# Patient Record
Sex: FEMALE
[De-identification: names, ages, dates, MRNs, and addresses within clinical notes are randomized per-mention and may not be internally consistent; named-entity substitution may affect disease eponyms.]

---

## 2018-04-10 ENCOUNTER — HOSPITAL ENCOUNTER (OUTPATIENT)
Dept: HOSPITAL 5 - GIO | Age: 83
Discharge: HOME | End: 2018-04-10
Attending: INTERNAL MEDICINE
Payer: MEDICARE

## 2018-04-10 VITALS — DIASTOLIC BLOOD PRESSURE: 86 MMHG | SYSTOLIC BLOOD PRESSURE: 133 MMHG

## 2018-04-10 DIAGNOSIS — Z91.048: ICD-10-CM

## 2018-04-10 DIAGNOSIS — J39.2: Primary | ICD-10-CM

## 2018-04-10 DIAGNOSIS — Z88.0: ICD-10-CM

## 2018-04-10 DIAGNOSIS — J44.9: ICD-10-CM

## 2018-04-10 DIAGNOSIS — Z88.5: ICD-10-CM

## 2018-04-10 DIAGNOSIS — I25.2: ICD-10-CM

## 2018-04-10 DIAGNOSIS — Z88.1: ICD-10-CM

## 2018-04-10 DIAGNOSIS — Z86.73: ICD-10-CM

## 2018-04-10 DIAGNOSIS — I10: ICD-10-CM

## 2018-04-10 PROCEDURE — 43248 EGD GUIDE WIRE INSERTION: CPT

## 2018-04-10 NOTE — SHORT STAY SUMMARY
Short Stay Documentation


Date of service: 04/10/18


Narrative H&P: 





The patient presents for EGD with esophageal dilation for dysphagia.  She has a 

history of XRT for  a head and neck cancer with XRT 15 years ago.





- History


Past Medical History: COPD, stroke, other (pacemaker, recent carotid 

endarterectomy)


Past Surgical History: Other (CEA, colon surgery, breast surgery)


Social history: no significant social history, lives with family, no smoking, 

no alcohol abuse





- Allergies and Medications


Current Medications: 


 Allergies





bacitracin [From Neosporin ([id number])] Allergy (Verified 10/24/17 15:42)


 Rash


codeine Allergy (Verified 10/24/17 15:42)


 N&V


ibuprofen Allergy (Verified 10/24/17 15:42)


 Unknown


neomycin [From Neosporin ([id number])] Allergy (Verified 10/24/17 15:42)


 Rash


Penicillins Allergy (Verified 10/24/17 15:42)


 Anaphylaxis


polymyxin B [From Neosporin ([id number])] Allergy (Verified 10/24/17 15:42)


 Rash


IVP dye Allergy (Uncoded 10/24/17 15:42)


 Rash





 Home Medications











 Medication  Instructions  Recorded  Confirmed  Last Taken  Type


 


RX: Benzonatate [Tessalon Perles] 100 mg PO TID 10/24/17 04/09/18 Unknown 

History


 


RX: Biotin 5,000 mcg PO DAILY 10/24/17 04/09/18 10/29/17 History


 


RX: Calcium Carbonate/Vitamin D3 1 each PO DAILY 10/24/17 04/09/18 10/29/17 

History





[Oyster Shell Calcium-Vit D Tab]     


 


RX: Carvedilol [Coreg] 3.125 mg PO BID 10/24/17 04/10/18 04/09/18 17:00 History


 


RX: Cetirizine HCl [Zyrtec] 10 mg PO DAILY 10/24/17 04/09/18 Unknown History


 


RX: Guaifen/Dextromethorphan/PE 15 ml PO PRN PRN 10/24/17 04/09/18 10/27/17 

History





[Robafen Cf Liquid]     


 


RX: Melatonin 10 mg PO HS 10/24/17 04/10/18 04/09/18 21:00 History


 


RX: Memantine HCl/Donepezil HCl 1 each PO DAILY 10/24/17 10/30/17 04/09/18 09:

00 History





[Namzaric 28 mg-10 mg Capsule]     


 


RX: Multivitamin [Multiple 1 each PO DAILY 10/24/17 04/09/18 10/29/17 History





Vitamins]     


 


RX: Niacin [Niacin ER] 500 mg PO DAILY 10/24/17 04/10/18 04/09/18 09:00 History


 


RX: Nitroglycerin [Nitrostat] 0.4 mg SL Q5M PRN 10/24/17 04/09/18 Unknown 

History


 


RX: Pantoprazole [Protonix TAB] 40 mg PO QDAY 10/24/17 04/09/18 10/30/17 History


 


RX: Polyethylene Glycol 3350 527 gm PO DAILY 10/24/17 10/24/17 Unknown History


 


RX: Pravastatin Sodium 20 mg PO QHS 10/24/17 04/10/18 04/09/18 21:00 History





[Pravastatin]     


 


RX: Sertraline [Zoloft] 50 mg PO QDAY 10/24/17 04/10/18 04/09/18 09:00 History


 


RX: amLODIPine [Norvasc] 2.5 mg PO DAILY 10/24/17 04/10/18 04/09/18 17:00 

History


 


RX: guaiFENesin [Mucinex] 600 mg PO Q12H 10/24/17 04/09/18 Unknown History


 


RX: traMADol [Ultram 50 MG tab] 50 mg PO BID 10/24/17 04/10/18 04/09/18 17:00 

History


 


RX: Arformoterol Nebu [Brovana 15 mcg IH Q12HRT  ml 11/07/17 04/09/18 Unknown Rx





Nebu]     


 


RX: Aspirin [Aspirin BABY CHEW TAB] 81 mg PO QDAY  tab.chew 11/07/17 04/10/18 04

/09/18 09:00 Rx


 


RX: Budesonide [Pulmicort Respules] 0.5 mg IH Q12HRT  nebu 11/07/17 04/09/18 

Unknown Rx


 


RX: Nystas/Diphen/Xyl Visc/Mylanta 15 ml PO TID  oral.liqd 11/07/17 04/09/18 

Unknown Rx





[Magic Mouthwash]     








Active Medications





Sodium Chloride (Nacl 0.9% 1000 Ml)  1,000 mls @ 50 mls/hr IV AS DIRECT KADEEM


   Last Admin: 04/10/18 10:35 Dose:  50 mls/hr











- Physical exam


General appearance: no acute distress, well-nourished


Integumentary: no rash, no growths, no abnormal pigmentation


HEENT: Atraumatic, PERRLA, EOMI, Mucous membr. moist/pink


Lungs: Clear to auscultation, Normal air movement


Breasts: deferred


Heart: Regular rate, Normal S1, Normal S2, No murmurs


Gastrointestinal: normoactive bowel sounds, no tenderness, no distended, no 

masses, no guarding, no organomegaly


Female Genitourinary: deferred


Rectal Exam: deferred


Extremities: no ischemia, pulses intact, pulses symmetrical, No edema, normal 

temperature, normal color, Full ROM


Neurological: Normal gait, Normal speech, Strength at 5/5 X4 ext, Normal tone, 

Sensation intact, Cranial nerves 3-12 NL





- Brief post op/procedure progress note


Date of procedure: 04/10/18


Procedure: 





see dictated report


Estimated blood loss: none


Pathology: none


Condition: stable





- Disposition


Condition at discharge: Good


Disposition: DC-01 TO HOME OR SELFCARE





- Discharge Diagnoses


(1) Dysphagia


Status: Chronic   





Short Stay Discharge Plan


Activity: advance as tolerated, other


Weight Bearing Status: Weight Bear as Tolerated


Diet: advance as tolerated


Follow up with: 


HUGO YU MD [Primary Care Provider] - 7 Days

## 2018-04-10 NOTE — ANESTHESIA CONSULTATION
Anesthesia Consult and Med Hx


Date of service: 04/10/18





- Airway


Anesthetic Teeth Evaluation: Good


ROM Head & Neck: Adequate


Mental/Hyoid Distance: Adequate


Mallampati Class: Class I


Intubation Access Assessment: Good





- Pulmonary Exam


CTA: Yes





- Cardiac Exam


Cardiac Exam: RRR





- Pre-Operative Health Status


ASA Pre-Surgery Classification: ASA3


Proposed Anesthetic Plan: IV Sedation





- Pulmonary


Hx Smoking: Yes


COPD: Yes





- Cardiovascular System


Hx Hypertension: Yes


Hx Coronary Artery Disease: Yes


Hx Heart Attack/AMI: Yes ("mild"  2 yrs ago)


Hx Pacemaker: Yes





- Central Nervous System


CVA: Yes (10/2017)


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





- Other Systems


Hx Cancer: Yes

## 2018-04-10 NOTE — OPERATIVE REPORT
Operative Report


Operative Report: 





Date of procedure: 04/10/2018





Procedure: Esophagogastroduodenoscopy with Trimble dilation of the esophagus-38 

[place]ese, 42 [place] and 46 [place] sequentially.





Preprocedure diagnosis: Dysphagia to solids.  History of radiation to the neck.





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 

prior radiation therapy.





Endoscopist: Dr. Pringle





Anesthesia: Monitored anesthesia care per anesthesia department





Medications: Propofol per anesthesia





Estimated blood loss: 0





After careful discussion of the nature and purpose of the procedure as well as 

details the technique risks benefits and alternatives consent was obtained.  

The patient was placed in the left lateral decubitus position and medicated per 

anesthesia.  The tip of the [employer]  video scope was passed per orum 

under direct vision into the esophagus and advanced into the stomach and 

descending duodenum.  The descending duodenum the duodenal bulb and pylorus 

were symmetrical and normal.  The scope was withdrawn into the stomach and the 

stomach then gently insufflated with air.  The antrum was normal.  The stomach 

was further insufflated and the scope was then retroflexed and partially 

withdrawn.  The cardia, fundus, and body of the stomach were within normal 

limits and easily distensible.The scope was then withdrawn in the forward 

position.  The esophagogastric junction was at 36 cm. The distal and mid 

esophageal body were normal.  There was the impression of mild stricturing at 

the cricopharyngeal inlet of the esophagus.  Dilation was performed in light of 

symptoms and prior history of radiation therapy.  A Trimble 38 [place] dilator 

was passed with mild resistance.  This was followed by passage of 42 and 46 

[place] Trimble dilators each with mild resistance.  The procedure was was well 

tolerated and the patient was observed in recovery.





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 

therapy.  Status post dilation to 46 [place] Trimble.





Plan: Repeat dilation as necessary.








Electronically signed:  Kirill Pringle MD

## 2018-09-12 ENCOUNTER — HOSPITAL ENCOUNTER (OUTPATIENT)
Dept: HOSPITAL 5 - GIO | Age: 83
Discharge: HOME | End: 2018-09-12
Attending: INTERNAL MEDICINE
Payer: MEDICARE

## 2018-09-12 VITALS — DIASTOLIC BLOOD PRESSURE: 67 MMHG | SYSTOLIC BLOOD PRESSURE: 152 MMHG

## 2018-09-12 DIAGNOSIS — J44.9: ICD-10-CM

## 2018-09-12 DIAGNOSIS — Z80.8: ICD-10-CM

## 2018-09-12 DIAGNOSIS — Z98.890: ICD-10-CM

## 2018-09-12 DIAGNOSIS — Z79.82: ICD-10-CM

## 2018-09-12 DIAGNOSIS — Z95.0: ICD-10-CM

## 2018-09-12 DIAGNOSIS — Z98.41: ICD-10-CM

## 2018-09-12 DIAGNOSIS — Z79.899: ICD-10-CM

## 2018-09-12 DIAGNOSIS — G20: ICD-10-CM

## 2018-09-12 DIAGNOSIS — Z98.42: ICD-10-CM

## 2018-09-12 DIAGNOSIS — F32.9: ICD-10-CM

## 2018-09-12 DIAGNOSIS — M19.90: ICD-10-CM

## 2018-09-12 DIAGNOSIS — Z86.73: ICD-10-CM

## 2018-09-12 DIAGNOSIS — I10: ICD-10-CM

## 2018-09-12 DIAGNOSIS — Z79.01: ICD-10-CM

## 2018-09-12 DIAGNOSIS — K21.9: ICD-10-CM

## 2018-09-12 DIAGNOSIS — I25.10: ICD-10-CM

## 2018-09-12 DIAGNOSIS — E78.00: ICD-10-CM

## 2018-09-12 DIAGNOSIS — Z85.05: ICD-10-CM

## 2018-09-12 DIAGNOSIS — K22.2: Primary | ICD-10-CM

## 2018-09-12 DIAGNOSIS — Z88.5: ICD-10-CM

## 2018-09-12 DIAGNOSIS — I25.2: ICD-10-CM

## 2018-09-12 DIAGNOSIS — F41.9: ICD-10-CM

## 2018-09-12 DIAGNOSIS — R13.10: ICD-10-CM

## 2018-09-12 DIAGNOSIS — Z88.8: ICD-10-CM

## 2018-09-12 PROCEDURE — 43248 EGD GUIDE WIRE INSERTION: CPT

## 2018-09-12 NOTE — OPERATIVE REPORT
Operative Report


Operative Report: 





Date of procedure: 09/12/2018





Procedure: Esophagogastroduodenoscopy with Trimble dilation-42, 46 and 50 

B[place]hi.





Preprocedure diagnosis: Dysphagia to solid foods with a history of a cervical 

esophageal stricture secondary to radiation therapy





Post procedure diagnosis: Mild cervical esophageal stricture.





Endoscopist: Dr. Pringle





Anesthesia: Monitored anesthesia care per anesthesia department





Medications: Propofol per anesthesia





Estimated blood loss: 0





After careful discussion of the nature and purpose of the procedure as well as 

details the technique risks benefits and alternatives consent was obtained.  

The patient was placed in the left lateral decubitus position and medicated per 

anesthesia.  The tip of the [employer]  video scope was passed per orum 

under direct vision into the esophagus and advanced into the stomach and 

descending duodenum.  The descending duodenum the duodenal bulb and pylorus 

were symmetrical and normal.  The scope was withdrawn into the stomach and the 

stomach then gently insufflated with air.  The antrum was normal.  The stomach 

was further insufflated and the scope was then retroflexed and partially 

withdrawn.  The cardia, fundus, and body of the stomach were within normal 

limits and easily distensible.The scope was then withdrawn in the forward 

position.  The esophagogastric junction was at 38 cm.  The esophageal body was 

normal throughout although little motility was observed.  There was the 

impression of mild narrowing in the cervical esophageal region without a tight 

stricture or resistance to passage of the scope.  Dilation was performed with 

42 and 40 Bangladeshi dilators with minimal resistance followed by passing a Trimble 

50 Bangl[place]hi dilator with mild resistance.  The procedure was was well tolerated 

and the patient was observed in recovery.





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

  Status post dilation to 50 [place]hi Trimble.





Plan: Office follow-up in 6 months.  Redilate when necessary.








Electronically signed:  Kirill Pringle MD

## 2018-09-12 NOTE — ANESTHESIA DAY OF SURGERY
Anesthesia Day of Surgery





- Day of Surgery


Patient Examined: Yes


Patient H&P Reviewed: Yes


Patient is NPO: Yes


Beta Blockers: Yes


Cardiac Clearance: No


Pulmonary Clearance: No

## 2018-09-12 NOTE — ANESTHESIA CONSULTATION
Anesthesia Consult and Med Hx


Date of service: 09/12/18





- Airway


Anesthetic Teeth Evaluation: Good


ROM Head & Neck: Adequate


Mental/Hyoid Distance: Adequate


Mallampati Class: Class III


Intubation Access Assessment: Probably Good





- Pulmonary Exam


CTA: Yes





- Cardiac Exam


Cardiac Exam: No Murmur





- Pre-Operative Health Status


ASA Pre-Surgery Classification: ASA3


Proposed Anesthetic Plan: MAC





- Pulmonary


Hx Smoking: No


SOB: Yes


COPD: Yes





- Cardiovascular System


Hx Hypertension: Yes


Hx Coronary Artery Disease: Yes


Hx Heart Attack/AMI: Yes (2014)


Hx Pacemaker: Yes (2015)





- Central Nervous System


CVA: Yes (SLIGHT LT SIDED WEAKNESS)


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





- Gastrointestinal


Hx Ulcer: No


Hx Gastroesophageal Reflux Disease: No





- Endocrine


Hx Renal Disease: No


Hx End Stage Renal Disease: No


Hx Cirrhosis: No


Hx Liver Disease: No


Hx Insulin Dependent Diabetes: No


Hx Non-Insulin Dependent Diabetes: No


Hx Thyroid Disease: No


Hx Hypothyroidism: No


Hx Hyperthyroidism: No





- Hematic


Hx Anemia: No


Hx Sickle Cell Disease: No





- Other Systems


Hx Alcohol Use: No


Hx Substance Use: No


Hx Cancer: Yes


Hx Obesity: No

## 2018-09-12 NOTE — SHORT STAY SUMMARY
Short Stay Documentation


Date of service: 09/12/18





- History


H&P: obtained from office





- Allergies and Medications


Current Medications: 


 Allergies





bacitracin [From Neosporin ([id number])] Allergy (Verified 10/24/17 15:42)


 Rash


codeine Allergy (Verified 10/24/17 15:42)


 N&V


ibuprofen Allergy (Verified 10/24/17 15:42)


 Unknown


neomycin [From Neosporin ([id number])] Allergy (Verified 10/24/17 15:42)


 Rash


Penicillins Allergy (Verified 10/24/17 15:42)


 Anaphylaxis


polymyxin B [From Neosporin ([id number])] Allergy (Verified 10/24/17 15:42)


 Rash


IVP dye Allergy (Uncoded 10/24/17 15:42)


 Rash





 Home Medications











 Medication  Instructions  Recorded  Confirmed  Last Taken  Type


 


RX: Benzonatate [Tessalon Perles] 100 mg PO TID 10/24/17 04/09/18 09/11/18 

History


 


RX: Biotin 5,000 mcg PO DAILY 10/24/17 04/09/18 09/11/18 History


 


RX: Calcium Carbonate/Vitamin D3 1 each PO DAILY 10/24/17 04/09/18 09/11/18 

History





[Oyster Shell Calcium-Vit D Tab]     


 


RX: Carvedilol [Coreg] 3.125 mg PO BID 10/24/17 04/10/18 09/11/18 History


 


RX: Cetirizine HCl [Zyrtec] 10 mg PO DAILY 10/24/17 04/09/18 09/11/18 History


 


RX: Guaifen/Dextromethorphan/PE 15 ml PO PRN PRN 10/24/17 04/09/18 09/11/18 

History





[Robafen Cf Liquid]     


 


RX: Melatonin 10 mg PO HS 10/24/17 04/10/18 09/11/18 History


 


RX: Memantine HCl/Donepezil HCl 1 each PO DAILY 10/24/17 10/30/17 09/11/18 

History





[Namzaric 28 mg-10 mg Capsule]     


 


RX: Multivitamin [Multiple 1 each PO DAILY 10/24/17 04/09/18 09/11/18 History





Vitamins]     


 


RX: Niacin [Niacin ER] 500 mg PO DAILY 10/24/17 04/10/18 09/11/18 History


 


RX: Nitroglycerin [Nitrostat] 0.4 mg SL Q5M PRN 10/24/17 04/09/18 09/10/18 

History


 


RX: Pantoprazole [Protonix TAB] 40 mg PO QDAY 10/24/17 04/09/18 09/11/18 History





    40 


 


RX: Polyethylene Glycol 3350 527 gm PO DAILY 10/24/17 10/24/17 Unknown History


 


RX: Pravastatin Sodium 20 mg PO QHS 10/24/17 04/10/18 09/11/18 History





[Pravastatin]    20 


 


RX: Sertraline [Zoloft] 50 mg PO QDAY 10/24/17 04/10/18 09/11/18 History





    50 


 


RX: amLODIPine [Norvasc] 2.5 mg PO DAILY 10/24/17 04/10/18 09/11/18 History





    5 


 


RX: guaiFENesin [Mucinex] 600 mg PO Q12H 10/24/17 04/09/18 09/11/18 History





    600 MG 


 


RX: traMADol [Ultram 50 MG tab] 50 mg PO BID 10/24/17 04/10/18 09/11/18 History





    50 


 


RX: Arformoterol Nebu [Brovana 15 mcg IH Q12HRT  ml 11/07/17 04/09/18 09/11/18 

Rx





Nebu]     


 


RX: Aspirin [Aspirin BABY CHEW TAB] 81 mg PO QDAY  tab.chew 11/07/17 04/10/18 09

/11/18 Rx


 


RX: Budesonide [Pulmicort Respules] 0.5 mg IH Q12HRT  nebu 11/07/17 04/09/18 09/ 11/18 Rx


 


RX: Nystas/Diphen/Xyl Visc/Mylanta 15 ml PO TID  oral.liqd 11/07/17 04/09/18 09/ 11/18 Rx





[Magic Mouthwash]     








Active Medications





Sodium Chloride (Nacl 0.9% 1000 Ml)  1,000 mls @ 50 mls/hr IV AS DIRECT KADEEM











- Brief post op/procedure progress note


Date of procedure: 09/12/18


Findings: 





see dictated report


Estimated blood loss: none


Pathology: none


Condition: stable





- Disposition


Condition at discharge: Good


Disposition: DC-01 TO HOME OR SELFCARE





- Discharge Diagnoses


(1) Dysphagia


Status: Chronic   





Short Stay Discharge Plan


Activity: other (no driving for 24 hours, soft diet for 24 hours.)


Weight Bearing Status: Weight Bear as Tolerated


Diet: advance as tolerated


Follow up with: 


HUGO YU MD [Primary Care Provider] - 7 Days